# Patient Record
Sex: MALE | Race: WHITE | NOT HISPANIC OR LATINO | Employment: FULL TIME | URBAN - METROPOLITAN AREA
[De-identification: names, ages, dates, MRNs, and addresses within clinical notes are randomized per-mention and may not be internally consistent; named-entity substitution may affect disease eponyms.]

---

## 2021-06-14 ENCOUNTER — THERAPY VISIT (OUTPATIENT)
Dept: PHYSICAL THERAPY | Facility: CLINIC | Age: 31
End: 2021-06-14
Payer: COMMERCIAL

## 2021-06-14 DIAGNOSIS — G89.29 CHRONIC RIGHT SHOULDER PAIN: ICD-10-CM

## 2021-06-14 DIAGNOSIS — M25.511 CHRONIC RIGHT SHOULDER PAIN: ICD-10-CM

## 2021-06-14 PROCEDURE — 97110 THERAPEUTIC EXERCISES: CPT | Mod: GP | Performed by: PHYSICAL THERAPIST

## 2021-06-14 PROCEDURE — 97161 PT EVAL LOW COMPLEX 20 MIN: CPT | Mod: GP | Performed by: PHYSICAL THERAPIST

## 2021-06-14 PROCEDURE — 97112 NEUROMUSCULAR REEDUCATION: CPT | Mod: GP | Performed by: PHYSICAL THERAPIST

## 2021-06-14 NOTE — LETTER
[facility]  [address]  [place] [address]  [phone]    2021    Re: Ronald Jang   :   1990  MRN:  [id number]   REFERRING PHYSICIAN:   Nini FAITH [facility]    Date of Initial Evaluation:  21  Visits:  Rxs Used: 1  Reason for Referral:  Chronic right shoulder pain    Physical Therapy Initial Evaluation    Subjective:  Ronald Jang is a 30 year old male with a right shoulder condition.    The condition occurred due to repetition/overuse.  The condition occurred recreation/sport.  This is a chronic condition.  Mechanism/History of injury/symptoms:  6/3/21 patient received MD orders for PT for right shoulder pain that has been present with overhand spiking a volleyball since .  Initially the pain would resolve on its own within a few minutes but the pain gradually became longer lasting.  He most recently played volleyball 10 days ago yet still has some pain in the shoulder.  The pain is located lateral, in the joint and the quality of pain is reported as strong aching.  The degree of pain is reported as 2-7/10. The timing of pain/symptoms is intermittent, not dependent on time of day. Associated symptoms include: none  Symptoms are exacerbated by: overhead serving/spiking a volleyball, lying on involved side, putting on an overhead shirt.  Symptoms are relieved by: rest.  Since onset symptoms are gradually worsening.  Special tests for this condition include: none.  Previous treatments for this condition include: none.  General health as reported by patient is excellent.  Pertinent medical history includes: none.  Medical allergies include: none.  Other surgeries include: none.  Current medications:  none  Current occupation: analyst.  Patient's home/work tasks include: computer work, prolonged sitting.  Patient is currently working in normal job without restrictions.  Potential  barriers to physical therapy: none.  Red flags: none.  Previous level of function: able to sleep on right side, put on overhead shirt, play volleyball without shoulder pain  Current functional restrictions:  Right shoulder pain sleeping on right side, putting on overhead shirt, playing volleyball    Objective:  SHOULDER:   PROM L PROM R AROM L AROM R MMT L MMT R   Flex 170 170 170 170, some mid to end range pain 5/5 5/5   Abd 170 170 170 165, some end range pain 5/5 5/5   Full Can     5/5 5/5   IR 60 38   5/5 5/5   ER   90 90 5/5 5/5   Ext/IR   T4 T6       Scapulothoraic Rhythm: Right scapular dyskinesis noted with overhead shoulder flexion and abduction.  Resting position demos prominent inferior angle and medial border bilaterally.    Palpation: no tenderness to palpation    Special tests:   L R   Impingement     Neer's - -   Hawkin's-Ranjith - +   Coracoid Impingement - +   Internal impingement - -   Labral     Anterior Slide - -   Irving's - + pain, no mechanical symptoms   Instability     Apprehension (anterior) - -   Relocation (anterior) - -   Anterior Load & Shift - -   Posterior Load & Shift - -   Posterior instability (with 90 degrees flex) - -   Multi-Directional Instability      Sulcus - -   Biceps      Speed's - -   Rotator Cuff Tear     Drop Arm - -   Belly Press - -   Lift off  - -     Assessment/Plan:    Patient is a 30 year old male with right side shoulder complaints.    Patient has the following significant findings with corresponding treatment plan.                Diagnosis 1:  Right shoulder pain    Pain -  hot/cold therapy, manual therapy, splint/taping/bracing/orthotics, self management, education and home program  Decreased ROM/flexibility - manual therapy, therapeutic exercise and home program  Decreased strength - therapeutic exercise, therapeutic activities and home program  Decreased proprioception - neuro re-education, therapeutic activities and home program  Decreased function -  therapeutic activities and home program    Therapy Evaluation Codes:   1) History comprised of:   Personal factors that impact the plan of care:      None.    Comorbidity factors that impact the plan of care are:      None.     Medications impacting care: None.  2) Examination of Body Systems comprised of:   Body structures and functions that impact the plan of care:      Shoulder.   Activity limitations that impact the plan of care are:      Dressing, Lifting, Sports, Throwing and Sleeping.  3) Clinical presentation characteristics are:   Stable/Uncomplicated.  4) Decision-Making    Low complexity using standardized patient assessment instrument and/or measureable assessment of functional outcome.    Cumulative Therapy Evaluation is: Low complexity.  Previous and current functional limitations:  (See Goal Flow Sheet for this information)    Short term and Long term goals: (See Goal Flow Sheet for this information)   Communication ability:  Patient appears to be able to clearly communicate and understand verbal and written communication and follow directions correctly.  Treatment Explanation - The following has been discussed with the patient:   RX ordered/plan of care  Anticipated outcomes  Possible risks and side effects  This patient would benefit from PT intervention to resume normal activities.   Rehab potential is good.    Frequency:  1 X week, once daily  Duration:  for 6 weeks  Discharge Plan:  Achieve all LTG.  Independent in home treatment program.  Reach maximal therapeutic benefit.    Thank you for your referral.    INQUIRIES  Therapist: Ashu Dior, PT, DPT, [facility] SERVICES [place] [facility]  [address]  [place] [address]  Phone: [phone]  Fax: [phone]

## 2021-06-14 NOTE — PROGRESS NOTES
Physical Therapy Initial Evaluation  Subjective:  Ronald Jang is a 30 year old male with a right shoulder condition.    The condition occurred due to repetition/overuse.  The condition occurred recreation/sport.  This is a chronic condition.    Mechanism/History of injury/symptoms:  6/3/21 patient received MD orders for PT for right shoulder pain that has been present with overhand spiking a volleyball since 2019.  Initially the pain would resolve on its own within a few minutes but the pain gradually became longer lasting.  He most recently played volleyball 10 days ago yet still has some pain in the shoulder.  The pain is located lateral, in the joint and the quality of pain is reported as strong aching.  The degree of pain is reported as 2-7/10. The timing of pain/symptoms is intermittent, not dependent on time of day. Associated symptoms include: none    Symptoms are exacerbated by: overhead serving/spiking a volleyball, lying on involved side, putting on an overhead shirt.  Symptoms are relieved by: rest.  Since onset symptoms are gradually worsening.    Special tests for this condition include: none.  Previous treatments for this condition include: none.    General health as reported by patient is excellent.  Pertinent medical history includes: none.  Medical allergies include: none.  Other surgeries include: none.  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  Patient is currently working in normal job without restrictions.    Potential barriers to physical therapy: none.  Red flags: none.    Previous level of function: able to sleep on right side, put on overhead shirt, play volleyball without shoulder pain  Current functional restrictions:  Right shoulder pain sleeping on right side, putting on overhead shirt, playing volleyball    HPI                    Objective:  SHOULDER:   PROM L PROM R AROM L AROM R MMT L MMT R    Flex 170 170 170 170, some mid to end range pain 5/5 5/5   Abd 170 170 170 165, some end range pain 5/5 5/5   Full Can     5/5 5/5   IR 60 38   5/5 5/5   ER   90 90 5/5 5/5   Ext/IR   T4 T6       Scapulothoraic Rhythm: Right scapular dyskinesis noted with overhead shoulder flexion and abduction.  Resting position demos prominent inferior angle and medial border bilaterally.    Palpation: no tenderness to palpation    Special tests:   L R   Impingement     Neer's - -   Hawkin's-Ranjith - +   Coracoid Impingement - +   Internal impingement - -   Labral     Anterior Slide - -   Ault's - + pain, no mechanical symptoms   Instability     Apprehension (anterior) - -   Relocation (anterior) - -   Anterior Load & Shift - -   Posterior Load & Shift - -   Posterior instability (with 90 degrees flex) - -   Multi-Directional Instability      Sulcus - -   Biceps      Speed's - -   Rotator Cuff Tear     Drop Arm - -   Belly Press - -   Lift off  - -           System    Physical Exam    General     ROS    Assessment/Plan:    Patient is a 30 year old male with right side shoulder complaints.    Patient has the following significant findings with corresponding treatment plan.                Diagnosis 1:  Right shoulder pain    Pain -  hot/cold therapy, manual therapy, splint/taping/bracing/orthotics, self management, education and home program  Decreased ROM/flexibility - manual therapy, therapeutic exercise and home program  Decreased strength - therapeutic exercise, therapeutic activities and home program  Decreased proprioception - neuro re-education, therapeutic activities and home program  Decreased function - therapeutic activities and home program    Therapy Evaluation Codes:   1) History comprised of:   Personal factors that impact the plan of care:      None.    Comorbidity factors that impact the plan of care are:      None.     Medications impacting care: None.  2) Examination of Body Systems comprised of:   Body  structures and functions that impact the plan of care:      Shoulder.   Activity limitations that impact the plan of care are:      Dressing, Lifting, Sports, Throwing and Sleeping.  3) Clinical presentation characteristics are:   Stable/Uncomplicated.  4) Decision-Making    Low complexity using standardized patient assessment instrument and/or measureable assessment of functional outcome.  Cumulative Therapy Evaluation is: Low complexity.    Previous and current functional limitations:  (See Goal Flow Sheet for this information)    Short term and Long term goals: (See Goal Flow Sheet for this information)     Communication ability:  Patient appears to be able to clearly communicate and understand verbal and written communication and follow directions correctly.  Treatment Explanation - The following has been discussed with the patient:   RX ordered/plan of care  Anticipated outcomes  Possible risks and side effects  This patient would benefit from PT intervention to resume normal activities.   Rehab potential is good.    Frequency:  1 X week, once daily  Duration:  for 6 weeks  Discharge Plan:  Achieve all LTG.  Independent in home treatment program.  Reach maximal therapeutic benefit.    Please refer to the daily flowsheet for treatment today, total treatment time and time spent performing 1:1 timed codes.

## 2021-06-21 ENCOUNTER — THERAPY VISIT (OUTPATIENT)
Dept: PHYSICAL THERAPY | Facility: CLINIC | Age: 31
End: 2021-06-21
Payer: COMMERCIAL

## 2021-06-21 DIAGNOSIS — G89.29 CHRONIC RIGHT SHOULDER PAIN: ICD-10-CM

## 2021-06-21 DIAGNOSIS — M25.511 CHRONIC RIGHT SHOULDER PAIN: ICD-10-CM

## 2021-06-21 PROCEDURE — 97112 NEUROMUSCULAR REEDUCATION: CPT | Mod: GP | Performed by: PHYSICAL THERAPIST

## 2021-06-21 PROCEDURE — 97140 MANUAL THERAPY 1/> REGIONS: CPT | Mod: GP | Performed by: PHYSICAL THERAPIST

## 2021-06-21 PROCEDURE — 97110 THERAPEUTIC EXERCISES: CPT | Mod: GP | Performed by: PHYSICAL THERAPIST

## 2021-06-28 ENCOUNTER — THERAPY VISIT (OUTPATIENT)
Dept: PHYSICAL THERAPY | Facility: CLINIC | Age: 31
End: 2021-06-28
Payer: COMMERCIAL

## 2021-06-28 DIAGNOSIS — G89.29 CHRONIC RIGHT SHOULDER PAIN: ICD-10-CM

## 2021-06-28 DIAGNOSIS — M25.511 CHRONIC RIGHT SHOULDER PAIN: ICD-10-CM

## 2021-06-28 PROCEDURE — 97112 NEUROMUSCULAR REEDUCATION: CPT | Mod: GP | Performed by: PHYSICAL THERAPIST

## 2021-06-28 PROCEDURE — 97140 MANUAL THERAPY 1/> REGIONS: CPT | Mod: GP | Performed by: PHYSICAL THERAPIST

## 2021-06-28 PROCEDURE — 97110 THERAPEUTIC EXERCISES: CPT | Mod: GP | Performed by: PHYSICAL THERAPIST

## 2021-07-21 ENCOUNTER — THERAPY VISIT (OUTPATIENT)
Dept: PHYSICAL THERAPY | Facility: CLINIC | Age: 31
End: 2021-07-21
Payer: COMMERCIAL

## 2021-07-21 DIAGNOSIS — M25.511 CHRONIC RIGHT SHOULDER PAIN: ICD-10-CM

## 2021-07-21 DIAGNOSIS — G89.29 CHRONIC RIGHT SHOULDER PAIN: ICD-10-CM

## 2021-07-21 PROCEDURE — 97112 NEUROMUSCULAR REEDUCATION: CPT | Mod: GP | Performed by: PHYSICAL THERAPIST

## 2021-07-21 PROCEDURE — 97110 THERAPEUTIC EXERCISES: CPT | Mod: GP | Performed by: PHYSICAL THERAPIST

## 2021-08-12 ENCOUNTER — THERAPY VISIT (OUTPATIENT)
Dept: PHYSICAL THERAPY | Facility: CLINIC | Age: 31
End: 2021-08-12
Payer: COMMERCIAL

## 2021-08-12 DIAGNOSIS — G89.29 CHRONIC RIGHT SHOULDER PAIN: ICD-10-CM

## 2021-08-12 DIAGNOSIS — M25.511 CHRONIC RIGHT SHOULDER PAIN: ICD-10-CM

## 2021-08-12 PROCEDURE — 97110 THERAPEUTIC EXERCISES: CPT | Mod: GP | Performed by: PHYSICAL THERAPIST

## 2021-08-12 PROCEDURE — 97140 MANUAL THERAPY 1/> REGIONS: CPT | Mod: GP | Performed by: PHYSICAL THERAPIST

## 2021-08-12 NOTE — LETTER
"[facility] [place] [facility]  [address]  [place] [address]  [phone]    2021    Re: Ronald Jang   :   1990  MRN:  [id number]   REFERRING PHYSICIAN:   Nini Downing    [facility]    Date of Initial Evaluation:  2021  Visits:  Rxs Used: 5  Reason for Referral:  Chronic right shoulder pain    EVALUATION SUMMARY    Subjective:  HPI  Physical Exam                  Objective:  System  Physical Exam  General   ROS    Assessment/Plan:    PROGRESS  REPORT    Progress reporting period is from 21 to 21.       SUBJECTIVE  Subjective changes noted by patient: Ronald reports his shoulder is better than it was 2 months ago with day to day function and sleeping on it.  He no longer has pain sleeping on his right side and he feels stronger in the shoulder.  He has attempted to return to volleyball with a limited \"pitch count\" on overhead serving/hitting.  He notes serving is worse because he cannot follow through like when he hits at the net.  The pain he feels now is not as sharp or severe, it does not last as long, and completely resolves with ibuprofen.  He is encouraged by the progress he has made but is also frustrated by the continued pain with volleyball.    Current pain level is 0/10 (at rest).     Previous pain level was  2/10.   Changes in function:  Yes, see above  Adverse reaction to treatment or activity: None    OBJECTIVE  Changes noted in objective findings:  Yes  Right shoulder AROM grossly WNL and not painful.   Right shoulder strength 5/5 in all planes except in empty can.    Muhlenberg's test positive for pain but no mechanical symptoms.    Ronald Jang   :   1990    Speed's test is negative.    Tenderness noted over supraspinatous and infraspinatous tendons.  Good scapulothoracic rhythm with AROM and stabilization with manual resisted strength testing.     ASSESSMENT/PLAN  Updated problem " list and treatment plan: Diagnosis 1:  Right shoulder pain    Pain -  hot/cold therapy, US, manual therapy, self management, education and home program  Decreased ROM/flexibility - manual therapy, therapeutic exercise and home program  Decreased strength - therapeutic exercise, therapeutic activities and home program  Decreased proprioception - neuro re-education, therapeutic activities and home program  Decreased function - therapeutic activities and home program  STG/LTGs have been met or progress has been made towards goals:  Yes (See Goal flow sheet completed today.)  Assessment of Progress: The patient's condition is improving.  The patient's condition has potential to improve.  Self Management Plans:  Patient has been instructed in a home treatment program.  Patient  has been instructed in self management of symptoms.    Ronald continues to require the following intervention to meet STG and LTG's:  PT    Recommendations:  This patient would benefit from continued therapy.     Frequency:  1 X a month, once daily  Duration:  for 1-2 months    Thank you for your referral.    INQUIRIES  Therapist: Ashu Dior, PT, DPT, SCS  [facility] SERVICES [place] [facility]  [address]  [place] [address]  Phone: [phone]  Fax: [phone]

## 2021-08-12 NOTE — PROGRESS NOTES
"Subjective:  HPI  Physical Exam                    Objective:  System    Physical Exam    General     ROS    Assessment/Plan:    PROGRESS  REPORT    Progress reporting period is from 6/14/21 to 8/12/21.       SUBJECTIVE  Subjective changes noted by patient: Ronald reports his shoulder is better than it was 2 months ago with day to day function and sleeping on it.  He no longer has pain sleeping on his right side and he feels stronger in the shoulder.  He has attempted to return to volleyball with a limited \"pitch count\" on overhead serving/hitting.  He notes serving is worse because he cannot follow through like when he hits at the net.  The pain he feels now is not as sharp or severe, it does not last as long, and completely resolves with ibuprofen.  He is encouraged by the progress he has made but is also frustrated by the continued pain with volleyball.    Current pain level is 0/10 (at rest).     Previous pain level was  2/10.   Changes in function:  Yes, see above  Adverse reaction to treatment or activity: None    OBJECTIVE  Changes noted in objective findings:  Yes  Right shoulder AROM grossly WNL and not painful.   Right shoulder strength 5/5 in all planes except in empty can.    Eggleston's test positive for pain but no mechanical symptoms.    Speed's test is negative.    Tenderness noted over supraspinatous and infraspinatous tendons.  Good scapulothoracic rhythm with AROM and stabilization with manual resisted strength testing.     ASSESSMENT/PLAN  Updated problem list and treatment plan: Diagnosis 1:  Right shoulder pain    Pain -  hot/cold therapy, US, manual therapy, self management, education and home program  Decreased ROM/flexibility - manual therapy, therapeutic exercise and home program  Decreased strength - therapeutic exercise, therapeutic activities and home program  Decreased proprioception - neuro re-education, therapeutic activities and home program  Decreased function - therapeutic activities " and home program  STG/LTGs have been met or progress has been made towards goals:  Yes (See Goal flow sheet completed today.)  Assessment of Progress: The patient's condition is improving.  The patient's condition has potential to improve.  Self Management Plans:  Patient has been instructed in a home treatment program.  Patient  has been instructed in self management of symptoms.    Ronald continues to require the following intervention to meet STG and LTG's:  PT    Recommendations:  This patient would benefit from continued therapy.     Frequency:  1 X a month, once daily  Duration:  for 1-2 months        Please refer to the daily flowsheet for treatment today, total treatment time and time spent performing 1:1 timed codes.

## 2021-09-25 ENCOUNTER — HEALTH MAINTENANCE LETTER (OUTPATIENT)
Age: 31
End: 2021-09-25

## 2022-01-13 PROBLEM — M25.511 CHRONIC RIGHT SHOULDER PAIN: Status: RESOLVED | Noted: 2021-06-14 | Resolved: 2022-01-13

## 2022-01-13 PROBLEM — G89.29 CHRONIC RIGHT SHOULDER PAIN: Status: RESOLVED | Noted: 2021-06-14 | Resolved: 2022-01-13

## 2023-01-07 ENCOUNTER — HEALTH MAINTENANCE LETTER (OUTPATIENT)
Age: 33
End: 2023-01-07

## 2023-03-06 ENCOUNTER — THERAPY VISIT (OUTPATIENT)
Dept: PHYSICAL THERAPY | Facility: CLINIC | Age: 33
End: 2023-03-06
Payer: COMMERCIAL

## 2023-03-06 DIAGNOSIS — M25.552 HIP PAIN, LEFT: ICD-10-CM

## 2023-03-06 PROCEDURE — 97110 THERAPEUTIC EXERCISES: CPT | Mod: GP | Performed by: PHYSICAL THERAPIST

## 2023-03-06 PROCEDURE — 97161 PT EVAL LOW COMPLEX 20 MIN: CPT | Mod: GP | Performed by: PHYSICAL THERAPIST

## 2023-03-06 NOTE — PROGRESS NOTES
Physical Therapy Initial Evaluation  Subjective:    Patient Health History  Ronald Jang being seen for Left hip.     Problem began: 1/1/2022.   Problem occurred: Over time   Pain is reported as 2/10 on pain scale.  General health as reported by patient is excellent.  Pertinent medical history includes: pain at night/rest.     Medical allergies: none.   Surgeries include:  None.    Current medications:  Anti-inflammatory.    Current occupation .   Primary job tasks include:  Computer work and prolonged sitting.                Ronald Jang is a 32 year old male with a left hip condition.    The condition occurred due to unknown cause.  The condition occurred with insidious onset.  This is a chronic condition.    Mechanism/History of injury/symptoms:  2/24/23 patient received MD orders for PT for left hip JULIENNE and labral tear.  He is scheduled to undergo surgery on 4/12/23.  The pain is located anterior, in the joint and the quality of pain is reported as aching.  The degree of pain is reported as 1-3/10. The timing of pain/symptoms is constant, worse at night. Associated symptoms include: locking/catching, stiffness    Symptoms are exacerbated by: prolonged sitting, rolling over in bed, deep squatting.  Symptoms are relieved by: ibuprofen.  Since onset symptoms are gradually worsening.    Special tests for this condition include: x-ray, MRI.  Previous treatments for this condition include: none.    Potential barriers to physical therapy: none.  Red flags: none.    Previous level of function: no hip pain with prolonged sitting, deep squatting, rolling in bed.  Current functional restrictions:  Left hip pain with deep squatting, rolling in bed, and prolonged sitting.                      Objective:  HIP:    PROM:   L  R   Flexion 110, + end range pain 120   Extension 15 15   Abduction nt nt   IR 15 25   ER 50 65     Strength:   L R   HIP     Flex 4/5 with pain 5/5   Ext 4/5 5/5   Abd 4/5 5-/5     Special  tests:   L R   Jamie's - -   Salvador - -   JAVY + -   FADIR + -     Palpation: no tenderness to palpation in left hip    Functional: demonstrates contralateral pelvic drop with single leg squat on left, normal pelvic alignment on right and good control with double leg squat    Gait: grossly WNL            System    Physical Exam    General     ROS    Assessment/Plan:    Patient is a 32 year old male with left side hip complaints.    Patient has the following significant findings with corresponding treatment plan.                Diagnosis 1:  Left hip pain    Pain -  hot/cold therapy, manual therapy, self management, education and home program  Decreased ROM/flexibility - manual therapy, therapeutic exercise and home program  Decreased strength - therapeutic exercise, therapeutic activities and home program  Decreased proprioception - neuro re-education, therapeutic activities and home program  Decreased function - therapeutic activities and home program    Cumulative Therapy Evaluation is: Low complexity.    Previous and current functional limitations:  (See Goal Flow Sheet for this information)    Short term and Long term goals: (See Goal Flow Sheet for this information)     Communication ability:  Patient appears to be able to clearly communicate and understand verbal and written communication and follow directions correctly.  Treatment Explanation - The following has been discussed with the patient:   RX ordered/plan of care  Anticipated outcomes  Possible risks and side effects  This patient would benefit from PT intervention to resume normal activities.   Rehab potential is good.    Frequency:  1 X week, once daily  Duration:  for 5 weeks  Discharge Plan:  Achieve all LTG.  Independent in home treatment program.  Reach maximal therapeutic benefit.    Please refer to the daily flowsheet for treatment today, total treatment time and time spent performing 1:1 timed codes.

## 2023-03-13 ENCOUNTER — THERAPY VISIT (OUTPATIENT)
Dept: PHYSICAL THERAPY | Facility: CLINIC | Age: 33
End: 2023-03-13
Payer: COMMERCIAL

## 2023-03-13 DIAGNOSIS — M25.552 HIP PAIN, LEFT: Primary | ICD-10-CM

## 2023-03-13 PROCEDURE — 97110 THERAPEUTIC EXERCISES: CPT | Mod: GP | Performed by: PHYSICAL THERAPIST

## 2023-03-13 PROCEDURE — 97112 NEUROMUSCULAR REEDUCATION: CPT | Mod: GP | Performed by: PHYSICAL THERAPIST

## 2023-03-20 ENCOUNTER — THERAPY VISIT (OUTPATIENT)
Dept: PHYSICAL THERAPY | Facility: CLINIC | Age: 33
End: 2023-03-20
Payer: COMMERCIAL

## 2023-03-20 DIAGNOSIS — M25.552 HIP PAIN, LEFT: Primary | ICD-10-CM

## 2023-03-20 PROCEDURE — 97110 THERAPEUTIC EXERCISES: CPT | Mod: GP | Performed by: PHYSICAL THERAPIST

## 2023-03-20 PROCEDURE — 97112 NEUROMUSCULAR REEDUCATION: CPT | Mod: GP | Performed by: PHYSICAL THERAPIST

## 2023-03-27 ENCOUNTER — THERAPY VISIT (OUTPATIENT)
Dept: PHYSICAL THERAPY | Facility: CLINIC | Age: 33
End: 2023-03-27
Payer: COMMERCIAL

## 2023-03-27 DIAGNOSIS — M25.552 HIP PAIN, LEFT: Primary | ICD-10-CM

## 2023-03-27 PROCEDURE — 97112 NEUROMUSCULAR REEDUCATION: CPT | Mod: GP | Performed by: PHYSICAL THERAPIST

## 2023-03-27 PROCEDURE — 97110 THERAPEUTIC EXERCISES: CPT | Mod: GP | Performed by: PHYSICAL THERAPIST

## 2023-03-27 ASSESSMENT — ACTIVITIES OF DAILY LIVING (ADL)
TWISTING/PIVOTING_ON_INVOLVED_LEG: MODERATE DIFFICULTY
GETTING_INTO_AND_OUT_OF_AN_AVERAGE_CAR: NO DIFFICULTY AT ALL
DEEP_SQUATTING: MODERATE DIFFICULTY
ROLLING_OVER_IN_BED: SLIGHT DIFFICULTY
STANDING_FOR_15_MINUTES: NO DIFFICULTY AT ALL
LIGHT_TO_MODERATE_WORK: NO DIFFICULTY AT ALL
HOS_ADL_COUNT: 17
WALKING_DOWN_STEEP_HILLS: SLIGHT DIFFICULTY
STEPPING_UP_AND_DOWN_CURBS: NO DIFFICULTY AT ALL
GOING_DOWN_1_FLIGHT_OF_STAIRS: NO DIFFICULTY AT ALL
SITTING_FOR_15_MINUTES: NO DIFFICULTY AT ALL
WALKING_APPROXIMATELY_10_MINUTES: SLIGHT DIFFICULTY
HOS_ADL_HIGHEST_POTENTIAL_SCORE: 68
GETTING_INTO_AND_OUT_OF_A_BATHTUB: NO DIFFICULTY AT ALL
RECREATIONAL_ACTIVITIES: SLIGHT DIFFICULTY
HOS_ADL_SCORE(%): 82.35
WALKING_UP_STEEP_HILLS: SLIGHT DIFFICULTY
WALKING_INITIALLY: NO DIFFICULTY AT ALL
HEAVY_WORK: SLIGHT DIFFICULTY
GOING_UP_1_FLIGHT_OF_STAIRS: NO DIFFICULTY AT ALL
HOS_ADL_ITEM_SCORE_TOTAL: 56
WALKING_15_MINUTES_OR_GREATER: MODERATE DIFFICULTY
PUTTING_ON_SOCKS_AND_SHOES: NO DIFFICULTY AT ALL

## 2023-03-27 NOTE — PROGRESS NOTES
Subjective:  HPI  Physical Exam                    Objective:  System    Physical Exam    General     ROS    Assessment/Plan:    DISCHARGE REPORT    Progress reporting period is from 3/6/23 to 3/27/23.       SUBJECTIVE  Subjective changes noted by patient:   Ronald reports his exercises are going well, he feels stronger and more mobile to a certain degree in his left hip.  However, he continues to get pain in the hip with certain positions/movements and with walking 1-2 miles.  He is scheduled to undergo surgery in 2 weeks and will continue his HEP until then.    Current pain level is  2/10.     Previous pain level was 3/10.   Changes in function:  Yes, see above.  Adverse reaction to treatment or activity: None    OBJECTIVE  Changes noted in objective findings:  Yes  Left hip PROM:    flexion 115 wtih end jose l pain    ER 55     IR 25    Left hip strength:    flexion 4+/5    abduction 5-/5     ER 5-/5     extension 5/5    FADIR + on left hip     ASSESSMENT/PLAN  Updated problem list and treatment plan: Diagnosis 1:  Left hip pain due to JULIENNE    Pain -  home program  Decreased ROM/flexibility - home program  Decreased strength - home program  Decreased proprioception - home program  Decreased function - home program  STG/LTGs have been met or progress has been made towards goals:  Yes (See Goal flow sheet completed today.)  Assessment of Progress: The patient's progress has plateaued.  Self Management Plans:  Patient is independent in a home treatment program.  Patient is independent in self management of symptoms.    Ronald continues to require the following intervention to meet STG and LTG's:  PRE-OP HEP    Recommendations:  Ronald will continue his pre-op HEP independently over the next 2 weeks in preparation for his hip surgery on 4/12/23.  He will start post-operative PT shortly afterward with a new evaluation to establish a new plan of care and new goals.  He is discharged from pre-op PT.    Please refer to the  daily flowsheet for treatment today, total treatment time and time spent performing 1:1 timed codes.

## 2023-04-14 ENCOUNTER — THERAPY VISIT (OUTPATIENT)
Dept: PHYSICAL THERAPY | Facility: CLINIC | Age: 33
End: 2023-04-14
Payer: COMMERCIAL

## 2023-04-14 DIAGNOSIS — Z98.890 S/P HIP ARTHROSCOPY: ICD-10-CM

## 2023-04-14 DIAGNOSIS — M25.552 HIP PAIN, LEFT: ICD-10-CM

## 2023-04-14 PROCEDURE — 97161 PT EVAL LOW COMPLEX 20 MIN: CPT | Mod: GP | Performed by: PHYSICAL THERAPIST

## 2023-04-14 PROCEDURE — 97110 THERAPEUTIC EXERCISES: CPT | Mod: GP | Performed by: PHYSICAL THERAPIST

## 2023-04-14 ASSESSMENT — ACTIVITIES OF DAILY LIVING (ADL)
HOS_ADL_ITEM_SCORE_TOTAL: 10
HOS_ADL_HIGHEST_POTENTIAL_SCORE: 36
HOS_ADL_COUNT: 9
PUTTING_ON_SOCKS_AND_SHOES: EXTREME DIFFICULTY
TWISTING/PIVOTING_ON_INVOLVED_LEG: EXTREME DIFFICULTY
DEEP_SQUATTING: EXTREME DIFFICULTY
GETTING_INTO_AND_OUT_OF_AN_AVERAGE_CAR: MODERATE DIFFICULTY
ROLLING_OVER_IN_BED: EXTREME DIFFICULTY
GOING_UP_1_FLIGHT_OF_STAIRS: EXTREME DIFFICULTY
SITTING_FOR_15_MINUTES: MODERATE DIFFICULTY
GETTING_INTO_AND_OUT_OF_A_BATHTUB: EXTREME DIFFICULTY
GOING_DOWN_1_FLIGHT_OF_STAIRS: EXTREME DIFFICULTY
RECREATIONAL_ACTIVITIES: UNABLE TO DO
STEPPING_UP_AND_DOWN_CURBS: MODERATE DIFFICULTY

## 2023-04-14 NOTE — PROGRESS NOTES
Physical Therapy Initial Evaluation  Subjective:    Patient Health History  Ronald Jang being seen for Left hip.     Problem began: 4/12/2023.   Problem occurred: Surgery   Pain is reported as 5/10 on pain scale.  General health as reported by patient is excellent.  Pertinent medical history includes: none.     Medical allergies: none.   Surgeries include:  Orthopedic surgery.    Current medications:  Anti-inflammatory, muscle relaxants and pain medication.    Current occupation is .   Primary job tasks include:  Computer work and prolonged sitting.                Ronald Jang is a 32 year old male with a left hip condition.    The condition occurred due to surgery.  The condition occurred as result of a labral tear and JULIENNE.  This is a new on chronic condition.    Mechanism/History of injury/symptoms:  4/12/23 patient underwent left hip arthroscopic labral repair and bony work for JULIENNE.  The pain is located in the joint, incisional and the quality of pain is reported as aching, sharp.  The degree of pain is reported as 5/10. The timing of pain/symptoms is constant. Associated symptoms include: stiffness, weakness, swelling    Symptoms are exacerbated by: walking, standing, stairs, squatting, rolling in bed.  Symptoms are relieved by: rest, ice.  Since onset symptoms are gradually improving.    Special tests for this condition include: x-ray, MRI prior to surgery.  Previous treatments for this condition include: pre-op PT, surgery.    Potential barriers to physical therapy: none.  Red flags: none.    Previous level of function: able to walk, stand, sit, manage stairs, squat, play volleyball without pain  Current functional restrictions:  Unable to walk without crutches, unable to stand with even weightbearing, use left leg on stairs, unable to squat or play volleyball                      Objective:  HIP:    PROM:   L  R   Flexion 90 120   Extension -10 20   Abduction 30 45   IR 5 30   ER 15 60      Strength:   L R   HIP     Flex nt 5/5   Ext nt nt   Abd nt nt   KNEE     Flex nt 5/5   Ext nt 5/5       Palpation: incisional tenderness on left hip    Gait: presents NWB with bilateral axillary crutches            System    Physical Exam    General     ROS    Assessment/Plan:    Patient is a 32 year old male with left side hip complaints.    Patient has the following significant findings with corresponding treatment plan.                Diagnosis 1:  S/p left hip arthroscopic labral repair with bony work to correct JULIENNE    Pain -  hot/cold therapy, manual therapy, self management, education and home program  Decreased ROM/flexibility - manual therapy, therapeutic exercise and home program  Decreased strength - therapeutic exercise, therapeutic activities and home program  Decreased proprioception - neuro re-education, therapeutic activities and home program  Impaired gait - gait training and home program  Decreased function - therapeutic activities, home program and functional performance testing    Cumulative Therapy Evaluation is: Low complexity.    Previous and current functional limitations:  (See Goal Flow Sheet for this information)    Short term and Long term goals: (See Goal Flow Sheet for this information)     Communication ability:  Patient appears to be able to clearly communicate and understand verbal and written communication and follow directions correctly.  Treatment Explanation - The following has been discussed with the patient:   RX ordered/plan of care  Anticipated outcomes  Possible risks and side effects  This patient would benefit from PT intervention to resume normal activities.   Rehab potential is good.    Frequency:  2 X week, once daily  Duration:  for 6 weeks tapering to 1 X a week over 8 weeks  Discharge Plan:  Achieve all LTG.  Independent in home treatment program.  Reach maximal therapeutic benefit.    Please refer to the daily flowsheet for treatment today, total treatment time  and time spent performing 1:1 timed codes.

## 2023-04-17 ENCOUNTER — THERAPY VISIT (OUTPATIENT)
Dept: PHYSICAL THERAPY | Facility: CLINIC | Age: 33
End: 2023-04-17
Payer: COMMERCIAL

## 2023-04-17 DIAGNOSIS — Z98.890 S/P HIP ARTHROSCOPY: ICD-10-CM

## 2023-04-17 DIAGNOSIS — M25.552 HIP PAIN, LEFT: Primary | ICD-10-CM

## 2023-04-17 PROCEDURE — 97110 THERAPEUTIC EXERCISES: CPT | Mod: GP | Performed by: PHYSICAL THERAPIST

## 2023-04-19 ENCOUNTER — THERAPY VISIT (OUTPATIENT)
Dept: PHYSICAL THERAPY | Facility: CLINIC | Age: 33
End: 2023-04-19
Payer: COMMERCIAL

## 2023-04-19 DIAGNOSIS — M25.552 HIP PAIN, LEFT: Primary | ICD-10-CM

## 2023-04-19 DIAGNOSIS — Z98.890 S/P HIP ARTHROSCOPY: ICD-10-CM

## 2023-04-19 PROCEDURE — 97110 THERAPEUTIC EXERCISES: CPT | Mod: GP | Performed by: PHYSICAL THERAPIST

## 2023-04-24 ENCOUNTER — THERAPY VISIT (OUTPATIENT)
Dept: PHYSICAL THERAPY | Facility: CLINIC | Age: 33
End: 2023-04-24
Payer: COMMERCIAL

## 2023-04-24 DIAGNOSIS — M25.552 HIP PAIN, LEFT: Primary | ICD-10-CM

## 2023-04-24 DIAGNOSIS — Z98.890 S/P HIP ARTHROSCOPY: ICD-10-CM

## 2023-04-24 PROCEDURE — 97110 THERAPEUTIC EXERCISES: CPT | Mod: GP | Performed by: PHYSICAL THERAPIST

## 2023-04-26 ENCOUNTER — THERAPY VISIT (OUTPATIENT)
Dept: PHYSICAL THERAPY | Facility: CLINIC | Age: 33
End: 2023-04-26
Payer: COMMERCIAL

## 2023-04-26 DIAGNOSIS — Z98.890 S/P HIP ARTHROSCOPY: ICD-10-CM

## 2023-04-26 DIAGNOSIS — M25.552 HIP PAIN, LEFT: Primary | ICD-10-CM

## 2023-04-26 PROCEDURE — 97112 NEUROMUSCULAR REEDUCATION: CPT | Mod: GP | Performed by: PHYSICAL THERAPIST

## 2023-04-26 PROCEDURE — 97110 THERAPEUTIC EXERCISES: CPT | Mod: GP | Performed by: PHYSICAL THERAPIST

## 2023-05-01 ENCOUNTER — THERAPY VISIT (OUTPATIENT)
Dept: PHYSICAL THERAPY | Facility: CLINIC | Age: 33
End: 2023-05-01
Payer: COMMERCIAL

## 2023-05-01 DIAGNOSIS — Z98.890 S/P HIP ARTHROSCOPY: ICD-10-CM

## 2023-05-01 DIAGNOSIS — M25.552 HIP PAIN, LEFT: Primary | ICD-10-CM

## 2023-05-01 PROCEDURE — 97110 THERAPEUTIC EXERCISES: CPT | Mod: GP | Performed by: PHYSICAL THERAPIST

## 2023-05-03 ENCOUNTER — THERAPY VISIT (OUTPATIENT)
Dept: PHYSICAL THERAPY | Facility: CLINIC | Age: 33
End: 2023-05-03
Payer: COMMERCIAL

## 2023-05-03 DIAGNOSIS — Z98.890 S/P HIP ARTHROSCOPY: ICD-10-CM

## 2023-05-03 DIAGNOSIS — M25.552 HIP PAIN, LEFT: Primary | ICD-10-CM

## 2023-05-03 PROCEDURE — 97110 THERAPEUTIC EXERCISES: CPT | Mod: GP | Performed by: PHYSICAL THERAPIST

## 2023-05-10 ENCOUNTER — THERAPY VISIT (OUTPATIENT)
Dept: PHYSICAL THERAPY | Facility: CLINIC | Age: 33
End: 2023-05-10
Payer: COMMERCIAL

## 2023-05-10 DIAGNOSIS — Z98.890 S/P HIP ARTHROSCOPY: ICD-10-CM

## 2023-05-10 DIAGNOSIS — M25.552 HIP PAIN, LEFT: Primary | ICD-10-CM

## 2023-05-10 PROCEDURE — 97112 NEUROMUSCULAR REEDUCATION: CPT | Mod: GP | Performed by: PHYSICAL THERAPIST

## 2023-05-10 PROCEDURE — 97110 THERAPEUTIC EXERCISES: CPT | Mod: GP | Performed by: PHYSICAL THERAPIST

## 2023-05-19 ENCOUNTER — THERAPY VISIT (OUTPATIENT)
Dept: PHYSICAL THERAPY | Facility: CLINIC | Age: 33
End: 2023-05-19
Payer: COMMERCIAL

## 2023-05-19 DIAGNOSIS — Z98.890 S/P HIP ARTHROSCOPY: ICD-10-CM

## 2023-05-19 DIAGNOSIS — M25.552 HIP PAIN, LEFT: Primary | ICD-10-CM

## 2023-05-19 PROCEDURE — 97112 NEUROMUSCULAR REEDUCATION: CPT | Mod: GP | Performed by: PHYSICAL THERAPIST

## 2023-05-19 PROCEDURE — 97110 THERAPEUTIC EXERCISES: CPT | Mod: GP | Performed by: PHYSICAL THERAPIST

## 2023-05-26 ENCOUNTER — THERAPY VISIT (OUTPATIENT)
Dept: PHYSICAL THERAPY | Facility: CLINIC | Age: 33
End: 2023-05-26
Payer: COMMERCIAL

## 2023-05-26 DIAGNOSIS — Z98.890 S/P HIP ARTHROSCOPY: ICD-10-CM

## 2023-05-26 DIAGNOSIS — M25.552 HIP PAIN, LEFT: Primary | ICD-10-CM

## 2023-05-26 PROCEDURE — 97110 THERAPEUTIC EXERCISES: CPT | Mod: GP

## 2023-05-26 PROCEDURE — 97112 NEUROMUSCULAR REEDUCATION: CPT | Mod: GP

## 2023-06-02 ENCOUNTER — THERAPY VISIT (OUTPATIENT)
Dept: PHYSICAL THERAPY | Facility: CLINIC | Age: 33
End: 2023-06-02
Payer: COMMERCIAL

## 2023-06-02 DIAGNOSIS — Z98.890 S/P HIP ARTHROSCOPY: ICD-10-CM

## 2023-06-02 DIAGNOSIS — M25.552 HIP PAIN, LEFT: Primary | ICD-10-CM

## 2023-06-02 PROCEDURE — 97110 THERAPEUTIC EXERCISES: CPT | Mod: GP | Performed by: PHYSICAL THERAPIST

## 2023-06-02 PROCEDURE — 97112 NEUROMUSCULAR REEDUCATION: CPT | Mod: GP | Performed by: PHYSICAL THERAPIST

## 2023-06-09 ENCOUNTER — THERAPY VISIT (OUTPATIENT)
Dept: PHYSICAL THERAPY | Facility: CLINIC | Age: 33
End: 2023-06-09
Payer: COMMERCIAL

## 2023-06-09 DIAGNOSIS — M25.552 HIP PAIN, LEFT: Primary | ICD-10-CM

## 2023-06-09 DIAGNOSIS — Z98.890 S/P HIP ARTHROSCOPY: ICD-10-CM

## 2023-06-09 PROCEDURE — 97140 MANUAL THERAPY 1/> REGIONS: CPT | Mod: GP | Performed by: PHYSICAL THERAPIST

## 2023-06-09 PROCEDURE — 97110 THERAPEUTIC EXERCISES: CPT | Mod: GP | Performed by: PHYSICAL THERAPIST

## 2023-06-09 NOTE — PROGRESS NOTES
PLAN  Continue therapy per current plan of care.     06/09/23 0500   Appointment Info   Signing clinician's name / credentials Ashu Asuma, DPT, SCS   Total/Authorized Visits 20 PER EVAL AND TREAT   Visits Used 11   Medical Diagnosis s/p L hip arthroscopy   PT Tx Diagnosis s/p Left hip arthroscopy   Precautions/Limitations per Dr. Ramon protocol   Progress Note/Certification   Onset of illness/injury or Date of Surgery 04/12/23   Therapy Frequency 1x/week   Predicted Duration 12 weeks   Progress Note Due Date 06/12/23   PT Goal 1   Goal Identifier Ambulation   Goal Description Patient will be able to walk >1 mile with normal gait, 0/10 pain   Rationale to maximize safety and independence with performance of ADLs and functional tasks;to maximize safety and independence within the home;to maximize safety and independence within the community;to maximize safety and independence with self cares   Goal Progress Walking >2 blocks, decreasing hitch in left hip.   Target Date 07/14/23   Subjective Report   Subjective Report Ronald reports his hip is coming along well.  He has a little bit of stiffness/soreness some mornings but no sharp pains.  He feels like his walking is very near normal, just a little bit tight at times.  He was able to be on his feet for 12 hours without any significant soreness or problems.  He is able to reciprocate stairs for the most part and is gaining strength and function gradually.   Objective Measures   Objective Measures Objective Measure 1;Objective Measure 2;Objective Measure 3;Objective Measure 4   Objective Measure 1   Objective Measure ROM   Details Left hip PROM: flexion 110 ER 50 IR 30 extension 15   Objective Measure 2   Objective Measure Strength   Details Left hip strength: flexion 4/5 abduction 5-/5 extension 5-/5  ER 5-/5   Objective Measure 3   Objective Measure Gait   Details smooth, steady gait without a limp   Objective Measure 4   Objective Measure Functinoal mobility  "  Details Demonstrates even limb loading and good form with double leg squat to 18 inch box depth.  Demonstrates good form and stability with rear lunge and 16 inch box step up.   Treatment Interventions (PT)   Interventions Therapeutic Procedure/Exercise;Manual Therapy   Therapeutic Procedure/Exercise   Therapeutic Procedures: strength, endurance, ROM, flexibillity minutes (69555) 30   Ther Proc 1 upright bike   Ther Proc 1 - Details SH 7 LEVEL 2 resistance x5 min   Ther Proc 2 - Details L hip flexion, ER/IR, CIRCUMDUCTION x10 min   Ther Proc 3 supine bent knee fall out   Ther Proc 3 - Details 3x30\" L   Ther Proc 4 modified clemencia hip flexor stretch   Ther Proc 4 - Details 3x30\" on L   Ther Proc 5 Bridging   Ther Proc 5 - Details Single leg x10 with 5\" hold   Ther Proc 6 step back lunge   Ther Proc 6 - Details x10 B   Ther Proc 7 16\" box step ups   Ther Proc 7 - Details x10   Ther Proc 8 lateral static lunge   Ther Proc 8 - Details x10 B   Manual Therapy   Manual Therapy: Mobilization, MFR, MLD, friction massage minutes (44994) 10   Manual Therapy 2 STM   Manual Therapy 2 - Details adductors, flexors x5 min   Manual Therapy Manual Therapy 2   Plan   Home program step back lunge, lateral lunge, step up 3x/week   Plan for next session initiate light double leg plyometrics   Total Session Time   Timed Code Treatment Minutes 40   Total Treatment Time (sum of timed and untimed services) 40         Beginning/End Dates of Progress Note Reporting Period:    to 06/09/2023    Referring Provider:  Ruddy Ramon    "

## 2023-06-15 ENCOUNTER — THERAPY VISIT (OUTPATIENT)
Dept: PHYSICAL THERAPY | Facility: CLINIC | Age: 33
End: 2023-06-15
Payer: COMMERCIAL

## 2023-06-15 DIAGNOSIS — Z98.890 S/P HIP ARTHROSCOPY: ICD-10-CM

## 2023-06-15 DIAGNOSIS — M25.552 HIP PAIN, LEFT: Primary | ICD-10-CM

## 2023-06-15 PROCEDURE — 97140 MANUAL THERAPY 1/> REGIONS: CPT | Mod: GP | Performed by: PHYSICAL THERAPIST

## 2023-06-15 PROCEDURE — 97110 THERAPEUTIC EXERCISES: CPT | Mod: GP | Performed by: PHYSICAL THERAPIST

## 2023-06-15 PROCEDURE — 97530 THERAPEUTIC ACTIVITIES: CPT | Mod: GP | Performed by: PHYSICAL THERAPIST

## 2023-06-28 ENCOUNTER — THERAPY VISIT (OUTPATIENT)
Dept: PHYSICAL THERAPY | Facility: CLINIC | Age: 33
End: 2023-06-28
Payer: COMMERCIAL

## 2023-06-28 DIAGNOSIS — M25.552 HIP PAIN, LEFT: Primary | ICD-10-CM

## 2023-06-28 DIAGNOSIS — Z98.890 S/P HIP ARTHROSCOPY: ICD-10-CM

## 2023-06-28 PROCEDURE — 97110 THERAPEUTIC EXERCISES: CPT | Mod: GP | Performed by: PHYSICAL THERAPIST

## 2023-06-28 PROCEDURE — 97530 THERAPEUTIC ACTIVITIES: CPT | Mod: GP | Performed by: PHYSICAL THERAPIST

## 2023-06-28 PROCEDURE — 97140 MANUAL THERAPY 1/> REGIONS: CPT | Mod: GP | Performed by: PHYSICAL THERAPIST

## 2023-07-05 ENCOUNTER — THERAPY VISIT (OUTPATIENT)
Dept: PHYSICAL THERAPY | Facility: CLINIC | Age: 33
End: 2023-07-05
Payer: COMMERCIAL

## 2023-07-05 DIAGNOSIS — M25.552 HIP PAIN, LEFT: Primary | ICD-10-CM

## 2023-07-05 DIAGNOSIS — Z98.890 S/P HIP ARTHROSCOPY: ICD-10-CM

## 2023-07-05 PROCEDURE — 97140 MANUAL THERAPY 1/> REGIONS: CPT | Mod: 59

## 2023-07-05 PROCEDURE — 97530 THERAPEUTIC ACTIVITIES: CPT | Mod: GP

## 2023-07-05 PROCEDURE — 97110 THERAPEUTIC EXERCISES: CPT | Mod: 59

## 2023-07-12 ENCOUNTER — THERAPY VISIT (OUTPATIENT)
Dept: PHYSICAL THERAPY | Facility: CLINIC | Age: 33
End: 2023-07-12
Payer: COMMERCIAL

## 2023-07-12 DIAGNOSIS — M25.552 HIP PAIN, LEFT: Primary | ICD-10-CM

## 2023-07-12 DIAGNOSIS — Z98.890 S/P HIP ARTHROSCOPY: ICD-10-CM

## 2023-07-12 PROCEDURE — 97110 THERAPEUTIC EXERCISES: CPT | Mod: 59 | Performed by: PHYSICAL THERAPIST

## 2023-07-12 PROCEDURE — 97140 MANUAL THERAPY 1/> REGIONS: CPT | Mod: 59 | Performed by: PHYSICAL THERAPIST

## 2023-07-12 PROCEDURE — 97530 THERAPEUTIC ACTIVITIES: CPT | Mod: GP | Performed by: PHYSICAL THERAPIST

## 2023-07-12 NOTE — PROGRESS NOTES
07/12/23 0500   Appointment Info   Signing clinician's name / credentials Ashu Asuma, DPT, SCS   Total/Authorized Visits 20 PER EVAL AND TREAT   Visits Used 16   Medical Diagnosis s/p L hip arthroscopy   PT Tx Diagnosis s/p Left hip arthroscopy   Precautions/Limitations per Dr. Ramon protocol   Progress Note/Certification   Onset of illness/injury or Date of Surgery 04/12/23   Therapy Frequency 1x/week   Predicted Duration 12 weeks   Progress Note Due Date 09/06/23   Progress Note Completed Date 07/12/23   PT Goal 1   Goal Identifier Ambulation   Goal Description Patient will be able to walk >1 mile with normal gait, 0/10 pain   Rationale to maximize safety and independence with performance of ADLs and functional tasks;to maximize safety and independence within the home;to maximize safety and independence within the community;to maximize safety and independence with self cares   Goal Progress walking 1 mile sometimes feels normal, sometimes sore in deep inner thigh   Target Date 07/14/23   Subjective Report   Subjective Report Ronald reports his hip is coming along well overall.  He does not have any sharp pains at all and no longer has soreness on the outside of his thigh.  He still gets sore in the deep inner thigh/groin with prolonged activity or certain positions.  Otherwise he is feeling good with his exercises.  The soreness seems muscular because it feels better after PT sessions and gets this worked on but then returns a day or two later.   Objective Measure 1   Objective Measure ROM   Details Left hip PROM:  flexion 120 with some end range stiffness ER 60 with end range stiffness IR 30  Extension 15   Objective Measure 2   Objective Measure Strength   Details Left hip strength: abduction 5-/5  ER 5-/5  Extension 4+/5   Objective Measure 3   Objective Measure Gait   Details Demonstrates normal walking gait, no signs of limping with high tempo gait drills in preparation for return to run.   Objective  "Measure 4   Objective Measure Functional mobility   Details Demonstrates even loading strategies with double limb squat and landing activities.  Demonstrates good proximal control with low level single leg plyometrics   Treatment Interventions (PT)   Interventions Therapeutic Procedure/Exercise;Therapeutic Activity;Manual Therapy   Therapeutic Procedure/Exercise   Therapeutic Procedures: strength, endurance, ROM, flexibillity minutes (74499) 10   Ther Proc 1 upright bike   Ther Proc 1 - Details SH 7 LEVEL 5 resistance x5 min   Ther Proc 2 supine hip flexion, ER, IR PROM   Ther Proc 2 - Details 10\" hold x5 each   Ther Proc 3 prone figure 4 with glut contraction   Ther Proc 3 - Details 5\" hold x10   Therapeutic Activity   Therapeutic Activities: dynamic activities to improve functional performance minutes (22209) 20   Therapeutic Activities Ther Act 5;Ther Act 6;Ther Act 7   Ther Act 1 A-skips, high knees, butt kicks   Ther Act 1 - Details WEI x4 each   Ther Act 2 gentle side shuffle and carioca   Ther Act 2 - Details WEI x4 each   Ther Act 3 single leg forward pogo hops   Ther Act 3 - Details WEI x2 B   Ther Act 5 10\" lateral double leg box jump   Ther Act 5 - Details 2x8 B   Ther Act 6 double leg lateral 10\" box jump into forward drop landing   Ther Act 6 - Details 2x8 B   Ther Act 7 lateral side step into vertical jump   Ther Act 7 - Details x10 B   Manual Therapy   Manual Therapy: Mobilization, MFR, MLD, friction massage minutes (31382) 10   Manual Therapy Manual Therapy 2   Manual Therapy 1 HIP JOINT DISTRACTION WITH BELT   Manual Therapy 2 STM   Manual Therapy 2 - Details adductors x7' with 10 reps of butterfly stretch   Manual Therapy 1 - Details X3 MIN   Plan   Plan for next session initiate return to jog intervals, more complex jumping drills   Total Session Time   Timed Code Treatment Minutes 40   Total Treatment Time (sum of timed and untimed services) 40       PLAN  Continue therapy per current plan of " care, including introduction to return to jog intervals, progress plyometrics to single leg and sport-specific movements, continue building core and lower extremity strength    Beginning/End Dates of Progress Note Reporting Period:  07/12/23 to 07/12/2023    Referring Provider:  Ruddy Ramon

## 2023-07-26 ENCOUNTER — THERAPY VISIT (OUTPATIENT)
Dept: PHYSICAL THERAPY | Facility: CLINIC | Age: 33
End: 2023-07-26
Payer: COMMERCIAL

## 2023-07-26 DIAGNOSIS — M25.552 HIP PAIN, LEFT: Primary | ICD-10-CM

## 2023-07-26 DIAGNOSIS — Z98.890 S/P HIP ARTHROSCOPY: ICD-10-CM

## 2023-07-26 PROCEDURE — 97110 THERAPEUTIC EXERCISES: CPT | Mod: 59 | Performed by: PHYSICAL THERAPIST

## 2023-07-26 PROCEDURE — 97530 THERAPEUTIC ACTIVITIES: CPT | Mod: GP | Performed by: PHYSICAL THERAPIST

## 2023-07-26 PROCEDURE — 97140 MANUAL THERAPY 1/> REGIONS: CPT | Mod: 59 | Performed by: PHYSICAL THERAPIST

## 2023-08-07 ENCOUNTER — THERAPY VISIT (OUTPATIENT)
Dept: PHYSICAL THERAPY | Facility: CLINIC | Age: 33
End: 2023-08-07
Payer: COMMERCIAL

## 2023-08-07 DIAGNOSIS — Z98.890 S/P HIP ARTHROSCOPY: ICD-10-CM

## 2023-08-07 DIAGNOSIS — M25.552 HIP PAIN, LEFT: Primary | ICD-10-CM

## 2023-08-07 PROCEDURE — 97530 THERAPEUTIC ACTIVITIES: CPT | Mod: GP | Performed by: PHYSICAL THERAPIST

## 2023-08-07 PROCEDURE — 97140 MANUAL THERAPY 1/> REGIONS: CPT | Mod: GP | Performed by: PHYSICAL THERAPIST

## 2023-08-07 PROCEDURE — 97110 THERAPEUTIC EXERCISES: CPT | Mod: GP | Performed by: PHYSICAL THERAPIST

## 2023-08-23 ENCOUNTER — THERAPY VISIT (OUTPATIENT)
Dept: PHYSICAL THERAPY | Facility: CLINIC | Age: 33
End: 2023-08-23
Payer: COMMERCIAL

## 2023-08-23 DIAGNOSIS — Z98.890 S/P HIP ARTHROSCOPY: ICD-10-CM

## 2023-08-23 DIAGNOSIS — M25.552 HIP PAIN, LEFT: Primary | ICD-10-CM

## 2023-08-23 PROCEDURE — 97140 MANUAL THERAPY 1/> REGIONS: CPT | Mod: GP | Performed by: PHYSICAL THERAPIST

## 2023-08-23 PROCEDURE — 97110 THERAPEUTIC EXERCISES: CPT | Mod: GP | Performed by: PHYSICAL THERAPIST

## 2023-08-23 NOTE — PROGRESS NOTES
08/23/23 0500   Appointment Info   Signing clinician's name / credentials Ashu Asuma, DPT, SCS   Total/Authorized Visits 20 PER EVAL AND TREAT   Visits Used 19   Medical Diagnosis s/p L hip arthroscopy   PT Tx Diagnosis s/p Left hip arthroscopy   Precautions/Limitations per Dr. Ramon protocol   Progress Note/Certification   Onset of illness/injury or Date of Surgery 04/12/23   Therapy Frequency 1x/week   Predicted Duration 12 weeks   Progress Note Due Date 09/06/23   Progress Note Completed Date 08/23/23   GOALS   PT Goals 2   PT Goal 1   Goal Identifier Ambulation   Goal Description Patient will be able to walk >1 mile with normal gait, 0/10 pain   Rationale to maximize safety and independence with performance of ADLs and functional tasks;to maximize safety and independence within the home;to maximize safety and independence within the community;to maximize safety and independence with self cares   Goal Progress walking >1 mile without pain   Target Date 07/14/23   Date Met 08/07/23   PT Goal 2   Goal Identifier Squatting   Goal Description Patient will be able to perform full depth single leg squat (symmetrical to right) with 0/10 pain   Rationale to maximize safety and independence with performance of ADLs and functional tasks;to maximize safety and independence within the home   Goal Progress Single leg squat on left LE roughly 75-80% of right LE   Target Date 10/02/23   Subjective Report   Subjective Report Ronald reports his hip is doing really well with the Next Step return to sport rehab workouts.  He has no pain with this or other activities.  He notes some stiffness/tightness that is primarily in the outer hip/thigh and hip flexor.   Objective Measure 1   Objective Measure Functional mobility   Details Single leg squat depth on left LE roughly 75-80% of right   Objective Measure 2   Objective Measure Jump/landing mechanics   Details Demonstrates symmetrical landing strategies with single leg jump  "tasks.   Objective Measure 3   Objective Measure ROM   Details Mild end range limitations with left hip passive ER/IR, FLEXION   Treatment Interventions (PT)   Interventions Therapeutic Procedure/Exercise;Manual Therapy   Therapeutic Procedure/Exercise   Therapeutic Procedures: strength, endurance, ROM, flexibillity minutes (80564) 25   Ther Proc 1 upright bike   Ther Proc 1 - Details SH 7 LEVEL 5 resistance x5 min   Ther Proc 2 piriformis stretch   Ther Proc 2 - Details supine 30\" x3; pretzel 30\" x3   Ther Proc 3 posterior capsule stretch   Ther Proc 3 - Details 30\" x3   Ther Proc 4 90/90 shin squares   Ther Proc 4 - Details x10 B   Ther Proc 5 foam roller for L hip flexor and lateral hip   Ther Proc 5 - Details x4 min   Manual Therapy   Manual Therapy: Mobilization, MFR, MLD, friction massage minutes (09256) 10   Manual Therapy Manual Therapy 2;Manual Therapy 3   Manual Therapy 1 HIP JOINT DISTRACTION WITH BELT   Manual Therapy 1 - Details X3 MIN   Manual Therapy 2 STM   Manual Therapy 2 - Details hip flexor, proximal quad, adductors x5 min   Manual Therapy 3 prone anterior hip mobs   Manual Therapy 3 - Details x2 min   Plan   Updates to plan of care only f/u if problems arise, d/c after next step if doing well   Total Session Time   Timed Code Treatment Minutes 35   Total Treatment Time (sum of timed and untimed services) 35       PLAN  Continue therapy per current plan of care.    Beginning/End Dates of Progress Note Reporting Period:  08/23/23 to 08/23/2023    Referring Provider:  Ruddy Ramon    "

## 2024-02-07 ASSESSMENT — ACTIVITIES OF DAILY LIVING (ADL)
WALKING_INITIALLY: NO DIFFICULTY AT ALL
WALKING_15_MINUTES_OR_GREATER: SLIGHT DIFFICULTY
STEPPING UP AND DOWN CURBS: NO DIFFICULTY AT ALL
TWISTING/PIVOTING_ON_INVOLVED_LEG: MODERATE DIFFICULTY
GETTING INTO AND OUT OF AN AVERAGE CAR: NO DIFFICULTY AT ALL
LANDING: SLIGHT DIFFICULTY
ADL_SCORE(%): 0
GETTING_INTO_AND_OUT_OF_A_BATHTUB: NO DIFFICULTY AT ALL
GOING DOWN 1 FLIGHT OF STAIRS: NO DIFFICULTY AT ALL
LIGHT_TO_MODERATE_WORK: NO DIFFICULTY AT ALL
ROLLING_OVER_IN_BED: NO DIFFICULTY AT ALL
STANDING FOR 15 MINUTES: NO DIFFICULTY AT ALL
JUMPING: NO DIFFICULTY AT ALL
WALKING_INITIALLY: NO DIFFICULTY AT ALL
STARTING_AND_STOPPING_QUICKLY: NO DIFFICULTY AT ALL
ADL_HIGHEST_POTENTIAL_SCORE: 68
LIGHT_TO_MODERATE_WORK: NO DIFFICULTY AT ALL
CUTTING/LATERAL_MOVEMENTS: SLIGHT DIFFICULTY
SITTING_FOR_15_MINUTES: NO DIFFICULTY AT ALL
SPORTS_COUNT: 9
GOING_UP_1_FLIGHT_OF_STAIRS: NO DIFFICULTY AT ALL
RECREATIONAL ACTIVITIES: SLIGHT DIFFICULTY
SPORTS_TOTAL_ITEM_SCORE: 0
WALKING_DOWN_STEEP_HILLS: NO DIFFICULTY AT ALL
DEEP SQUATTING: SLIGHT DIFFICULTY
STEPPING_UP_AND_DOWN_CURBS: NO DIFFICULTY AT ALL
PUTTING ON SOCKS AND SHOES: NO DIFFICULTY AT ALL
ADL_COUNT: 17
LOW_IMPACT_ACTIVITIES_LIKE_FAST_WALKING: NO DIFFICULTY AT ALL
HOS_ADL_HIGHEST_POTENTIAL_SCORE: 68
RECREATIONAL_ACTIVITIES: SLIGHT DIFFICULTY
SITTING FOR 15 MINUTES: NO DIFFICULTY AT ALL
GOING_DOWN_1_FLIGHT_OF_STAIRS: NO DIFFICULTY AT ALL
ABILITY_TO_PERFORM_ACTIVITY_WITH_YOUR_NORMAL_TECHNIQUE: SLIGHT DIFFICULTY
HOW_WOULD_YOU_RATE_YOUR_CURRENT_LEVEL_OF_FUNCTION?: NEARLY NORMAL
GOING UP 1 FLIGHT OF STAIRS: NO DIFFICULTY AT ALL
PUTTING_ON_SOCKS_AND_SHOES: NO DIFFICULTY AT ALL
WALKING_FOR_APPROXIMATELY_10_MINUTES: NO DIFFICULTY AT ALL
HEAVY_WORK: NO DIFFICULTY AT ALL
PLEASE_INDICATE_YOR_PRIMARY_REASON_FOR_REFERRAL_TO_THERAPY:: HIP
GETTING_INTO_AND_OUT_OF_A_BATHTUB: NO DIFFICULTY AT ALL
ROLLING OVER IN BED: NO DIFFICULTY AT ALL
ABILITY_TO_PARTICIPATE_IN_YOUR_DESIRED_SPORT_AS_LONG_AS_YOU_WOULD_LIKE: SLIGHT DIFFICULTY
WALKING_DOWN_STEEP_HILLS: NO DIFFICULTY AT ALL
HOW_WOULD_YOU_RATE_YOUR_CURRENT_LEVEL_OF_FUNCTION_DURING_YOUR_USUAL_ACTIVITIES_OF_DAILY_LIVING_FROM_0_TO_100_WITH_100_BEING_YOUR_LEVEL_OF_FUNCTION_PRIOR_TO_YOUR_HIP_PROBLEM_AND_0_BEING_THE_INABILITY_TO_PERFORM_ANY_OF_YOUR_USUAL_DAILY_ACTIVITIES?: 75
HOS_ADL_SCORE(%): 92.65
DEEP_SQUATTING: SLIGHT DIFFICULTY
HOS_ADL_ITEM_SCORE_TOTAL: 63
SPORTS_SCORE(%): 0
ADL_TOTAL_ITEM_SCORE: 0
GETTING_INTO_AND_OUT_OF_AN_AVERAGE_CAR: NO DIFFICULTY AT ALL
HOW_WOULD_YOU_RATE_YOUR_CURRENT_LEVEL_OF_FUNCTION_DURING_YOUR_SPORTS_RELATED_ACTIVITIES_FROM_0_TO_100_WITH_100_BEING_YOUR_LEVEL_OF_FUNCTION_PRIOR_TO_YOUR_HIP_PROBLEM_AND_0_BEING_THE_INABILITY_TO_PERFORM_ANY_OF_YOUR_USUAL_DAILY_ACTIVITIES?: 75
WALKING_APPROXIMATELY_10_MINUTES: NO DIFFICULTY AT ALL
HEAVY_WORK: NO DIFFICULTY AT ALL
WALKING_15_MINUTES_OR_GREATER: SLIGHT DIFFICULTY
STANDING_FOR_15_MINUTES: NO DIFFICULTY AT ALL
TWISTING/PIVOTING ON INVOLVED LEG: MODERATE DIFFICULTY
WALKING_UP_STEEP_HILLS: NO DIFFICULTY AT ALL
SPORTS_HIGHEST_POTENTIAL_SCORE: 36
WALKING_UP_STEEP_HILLS: NO DIFFICULTY AT ALL
HOW_WOULD_YOU_RATE_YOUR_CURRENT_LEVEL_OF_FUNCTION_DURING_YOUR_USUAL_ACTIVITIES_OF_DAILY_LIVING_FROM_0_TO_100_WITH_100_BEING_YOUR_LEVEL_OF_FUNCTION_PRIOR_TO_YOUR_HIP_PROBLEM_AND_0_BEING_THE_INABILITY_TO_PERFORM_ANY_OF_YOUR_USUAL_DAILY_ACTIVITIES?: 75

## 2024-02-08 ENCOUNTER — THERAPY VISIT (OUTPATIENT)
Dept: PHYSICAL THERAPY | Facility: CLINIC | Age: 34
End: 2024-02-08
Payer: COMMERCIAL

## 2024-02-08 DIAGNOSIS — M25.552 HIP PAIN, LEFT: Primary | ICD-10-CM

## 2024-02-08 PROCEDURE — 97110 THERAPEUTIC EXERCISES: CPT | Mod: GP | Performed by: PHYSICAL THERAPIST

## 2024-02-08 PROCEDURE — 97161 PT EVAL LOW COMPLEX 20 MIN: CPT | Mod: GP | Performed by: PHYSICAL THERAPIST

## 2024-02-08 NOTE — PROGRESS NOTES
PHYSICAL THERAPY EVALUATION  Type of Visit: Evaluation    See electronic medical record for Abuse and Falls Screening details.    Subjective       Presenting condition or subjective complaint: Lingering hip pain  Date of onset: 01/22/24 (MD order date)    Relevant medical history:     Dates & types of surgery: 4/12/23    Prior diagnostic imaging/testing results: MRI; CT scan; X-ray     Prior therapy history for the same diagnosis, illness or injury: Yes Pt, tried one dry needling appointment but did not notice any change    Prior Level of Function  Transfers: Independent  Ambulation: Independent  ADL: Independent  IADL: Driving, Housekeeping, Laundry, Meal preparation, Work, Yard work    Living Environment  Social support: With a significant other or spouse   Type of home: House   Stairs to enter the home: Yes 3 Is there a railing: No   Ramp: No   Stairs inside the home: Yes 15 Is there a railing: Yes   Help at home: None  Equipment owned: Crutches; Bath bench     Employment: Yes   Hobbies/Interests: Volleyball    Patient goals for therapy: Live with no pain or discomfort    Pain assessment: Pain present     Objective   HIP EVALUATION  PAIN: Pain Level at Rest: 1/10  Pain Level with Use: 4/10  Pain Location: hip  Pain Quality: Aching and Dull  Pain Frequency: constant  Pain is Worst: daytime  Pain is Exacerbated By: deep squatting, prolonged walking or standing, athletic activity (volleyball)  Pain is Relieved By: rest  Pain Progression: Unchanged  INTEGUMENTARY (edema, incisions):   POSTURE:  excessive anterior pelvic tilt in standing  GAIT:   Weightbearing Status: WBAT  Assistive Device(s): None  Gait Deviations: WNL  BALANCE/PROPRIOCEPTION: WNL  WEIGHTBEARING ALIGNMENT:   NON-WEIGHTBEARING ALIGNMENT:    ROM:   (Degrees) Left AROM Left PROM  Right AROM Right PROM   Hip Flexion  110 with pain  110 no pain   Hip Extension  10  10   Hip Abduction  40 with pain  40 no pain   Hip Adduction       Hip  Internal Rotation  30  25   Hip External Rotation  45 with pain  65 no pain   Pain:   End feel:     PELVIC/SI SCREEN:   STRENGTH:   Pain: - none + mild ++ moderate +++ severe  Strength Scale: 0-5/5 Left Right   Hip Flexion 5/5 5/5   Hip Extension 5/5 5/5   Hip Abduction 5-/5 5/5   Hip Adduction 5/5 5/5   Hip Internal Rotation 5/5 5/5   Hip External Rotation 5-/5 5-/5   Knee Flexion 5/5 5/5   Knee Extension 5/5 5/5     LE FLEXIBILITY:   SPECIAL TESTS:    Left Right   JAVY + -   FADIR/Labrum/JULIENNE + -   Femoral Nerve - -   Jamie's - -   Piriformis - -     FUNCTIONAL TESTS: Double Leg Squat: Good technique/no significant findings and painful in left anterior hip  Single Leg Squat: Anterior knee translation, Contralateral hip drop, and Improper use of glutes/hips  PALPATION:  left hip flexors, adductors tender to palpation  JOINT MOBILITY: decreased posterior femoral glide on left hip    Assessment & Plan   CLINICAL IMPRESSIONS  Medical Diagnosis: Left hip pain    Treatment Diagnosis: Left hip pain   Impression/Assessment: Patient is a 33 year old male with left hip complaints.  The following significant findings have been identified: Pain, Decreased ROM/flexibility, Decreased joint mobility, Decreased strength, Decreased proprioception, and Decreased activity tolerance. These impairments interfere with their ability to perform self care tasks, work tasks, recreational activities, household chores, household mobility, and community mobility as compared to previous level of function.     Clinical Decision Making (Complexity):  Clinical Presentation: Stable/Uncomplicated  Clinical Presentation Rationale: based on medical and personal factors listed in PT evaluation  Clinical Decision Making (Complexity): Low complexity    PLAN OF CARE  Treatment Interventions:  Interventions: Manual Therapy, Neuromuscular Re-education, Therapeutic Activity, Therapeutic Exercise, Self-Care/Home Management    Long Term Goals     PT Goal  1  Goal Identifier: Squatting  Goal Description: Patient will be able to perform full depth double leg squat and symmetrical single leg squat depth with 0/10 hip pain.  Rationale: to maximize safety and independence with performance of ADLs and functional tasks;to maximize safety and independence within the home;to maximize safety and independence within the community;to maximize safety and independence with self cares  Goal Progress: Pain with full depth squatting  Target Date: 05/02/24      Frequency of Treatment: 1x/week  Duration of Treatment: 12 weeks    Recommended Referrals to Other Professionals:  none  Education Assessment:   Learner/Method: Patient;Pictures/Video    Risks and benefits of evaluation/treatment have been explained.   Patient/Family/caregiver agrees with Plan of Care.     Evaluation Time:     PT Eval, Low Complexity Minutes (81067): 25       Signing Clinician: Amilcar Dior PT

## 2024-02-10 ENCOUNTER — HEALTH MAINTENANCE LETTER (OUTPATIENT)
Age: 34
End: 2024-02-10

## 2024-02-23 ENCOUNTER — THERAPY VISIT (OUTPATIENT)
Dept: PHYSICAL THERAPY | Facility: CLINIC | Age: 34
End: 2024-02-23
Payer: COMMERCIAL

## 2024-02-23 DIAGNOSIS — M25.552 HIP PAIN, LEFT: Primary | ICD-10-CM

## 2024-02-23 PROCEDURE — 97110 THERAPEUTIC EXERCISES: CPT | Mod: GP | Performed by: STUDENT IN AN ORGANIZED HEALTH CARE EDUCATION/TRAINING PROGRAM

## 2024-02-23 PROCEDURE — 97140 MANUAL THERAPY 1/> REGIONS: CPT | Mod: GP | Performed by: STUDENT IN AN ORGANIZED HEALTH CARE EDUCATION/TRAINING PROGRAM

## 2024-03-07 ENCOUNTER — THERAPY VISIT (OUTPATIENT)
Dept: PHYSICAL THERAPY | Facility: CLINIC | Age: 34
End: 2024-03-07
Payer: COMMERCIAL

## 2024-03-07 DIAGNOSIS — M25.552 HIP PAIN, LEFT: Primary | ICD-10-CM

## 2024-03-07 PROCEDURE — 97140 MANUAL THERAPY 1/> REGIONS: CPT | Mod: GP | Performed by: PHYSICAL THERAPIST

## 2024-03-07 PROCEDURE — 97110 THERAPEUTIC EXERCISES: CPT | Mod: GP | Performed by: PHYSICAL THERAPIST

## 2024-03-20 ENCOUNTER — THERAPY VISIT (OUTPATIENT)
Dept: PHYSICAL THERAPY | Facility: CLINIC | Age: 34
End: 2024-03-20
Payer: COMMERCIAL

## 2024-03-20 DIAGNOSIS — M25.552 HIP PAIN, LEFT: Primary | ICD-10-CM

## 2024-03-20 PROCEDURE — 97110 THERAPEUTIC EXERCISES: CPT | Mod: GP | Performed by: PHYSICAL THERAPIST

## 2024-03-20 PROCEDURE — 97140 MANUAL THERAPY 1/> REGIONS: CPT | Mod: GP | Performed by: PHYSICAL THERAPIST

## 2024-04-17 ENCOUNTER — THERAPY VISIT (OUTPATIENT)
Dept: PHYSICAL THERAPY | Facility: CLINIC | Age: 34
End: 2024-04-17
Payer: COMMERCIAL

## 2024-04-17 DIAGNOSIS — M25.552 HIP PAIN, LEFT: Primary | ICD-10-CM

## 2024-04-17 PROCEDURE — 97110 THERAPEUTIC EXERCISES: CPT | Mod: GP | Performed by: PHYSICAL THERAPIST

## 2024-04-17 PROCEDURE — 97140 MANUAL THERAPY 1/> REGIONS: CPT | Mod: GP | Performed by: PHYSICAL THERAPIST

## 2024-04-17 NOTE — PROGRESS NOTES
04/17/24 0500   Appointment Info   Signing clinician's name / credentials Ashu Ovi, DPT, SCS   Total/Authorized Visits 12   Visits Used 5   Medical Diagnosis Left hip pain   PT Tx Diagnosis Left hip pain   Other pertinent information history of left hip arthroscopic labral repair, osteotomy on 4/12/23   Progress Note/Certification   Onset of illness/injury or Date of Surgery 01/22/24  (MD order date)   Therapy Frequency 1x/week   Predicted Duration 12 weeks   Progress Note Due Date 04/04/24   PT Goal 1   Goal Identifier Squatting   Goal Description Patient will be able to perform full depth double leg squat and symmetrical single leg squat depth with 0/10 hip pain.   Rationale to maximize safety and independence with performance of ADLs and functional tasks;to maximize safety and independence within the home;to maximize safety and independence within the community;to maximize safety and independence with self cares   Goal Progress no pain with full depth squat or single leg squat to partial depth   Target Date 05/02/24   Subjective Report   Subjective Report Ronald reports his hip mobility and overall pain have improved.  He is sore all over today after playing volleyball yesterday for the first time in one month.  He notes his hip felt more mobile and did not have any sharp pains while pain or afterward.  He was sore later on but his entire body was sore.   Objective Measure 1   Objective Measure ROM   Details Left hip PROM:  ER 60  IR 30  flexion 115-120   Objective Measure 2   Objective Measure Functional mobility   Details Performed full depth body squat without hip pain, single leg squat to max depth without pain, mild loss of hip adduction/abduction control   Treatment Interventions (PT)   Interventions Therapeutic Procedure/Exercise;Manual Therapy   Therapeutic Procedure/Exercise   Therapeutic Procedures: strength, endurance, ROM, flexibility minutes (14486) 25   Ther Proc 1 standing 3rd position glut set  "  Ther Proc 1 - Details 5\" hold x10   Ther Proc 2 PRONE hip ER/IR windshield wipers   Ther Proc 2 - Details x30   Ther Proc 3 SL hip IR   Ther Proc 3 - Details ag x30   Ther Proc 4 SL clam ER   Ther Proc 4 - Details ag x30   Ther Proc 5 SLR flexion over cone   Ther Proc 5 - Details x30   Ther Proc 6 \"shin box\" 90/90 hip stretch x 12 reps   Skilled Intervention exercise selection, instruction   Patient Response/Progress increased ROM with less pain   Manual Therapy   Manual Therapy: Mobilization, MFR, MLD, friction massage minutes (42829) 15   Manual Therapy Manual Therapy 2   Manual Therapy 1 Hip belted distraction with post, lateral and inferior glides/mobs with ER   Manual Therapy 1 - Details x 6 min   Manual Therapy 2 STM to hip flexor, prox quad, adductor/groin   Manual Therapy 2 - Details x 10 min   Skilled Intervention manual skills   Patient Response/Progress increased tissue mobility   Education   Learner/Method Patient;Pictures/Video   Plan   Home program PTRX   Plan for next session assess his ER ROM, consider lateral lunget, consider light plyos   Total Session Time   Timed Code Treatment Minutes 40   Total Treatment Time (sum of timed and untimed services) 40         PLAN  Continue therapy per current plan of care.    Beginning/End Dates of Progress Note Reporting Period:    02/08/24 to 04/17/2024    Referring Provider:  Ruddy Ramon    "

## 2025-03-02 ENCOUNTER — HEALTH MAINTENANCE LETTER (OUTPATIENT)
Age: 35
End: 2025-03-02